# Patient Record
Sex: MALE | Race: WHITE
[De-identification: names, ages, dates, MRNs, and addresses within clinical notes are randomized per-mention and may not be internally consistent; named-entity substitution may affect disease eponyms.]

---

## 2021-12-30 ENCOUNTER — HOSPITAL ENCOUNTER (INPATIENT)
Dept: HOSPITAL 47 - EC | Age: 51
LOS: 5 days | Discharge: HOME | DRG: 872 | End: 2022-01-04
Attending: HOSPITALIST | Admitting: HOSPITALIST
Payer: COMMERCIAL

## 2021-12-30 DIAGNOSIS — Z82.49: ICD-10-CM

## 2021-12-30 DIAGNOSIS — Z20.822: ICD-10-CM

## 2021-12-30 DIAGNOSIS — I71.2: ICD-10-CM

## 2021-12-30 DIAGNOSIS — E66.01: ICD-10-CM

## 2021-12-30 DIAGNOSIS — A41.9: Primary | ICD-10-CM

## 2021-12-30 DIAGNOSIS — D63.8: ICD-10-CM

## 2021-12-30 DIAGNOSIS — K76.0: ICD-10-CM

## 2021-12-30 DIAGNOSIS — B17.9: ICD-10-CM

## 2021-12-30 DIAGNOSIS — I10: ICD-10-CM

## 2021-12-30 DIAGNOSIS — L03.116: ICD-10-CM

## 2021-12-30 LAB
ALBUMIN SERPL-MCNC: 3.1 G/DL (ref 3.5–5)
ALP SERPL-CCNC: 159 U/L (ref 38–126)
ALT SERPL-CCNC: 204 U/L (ref 4–49)
ANION GAP SERPL CALC-SCNC: 7 MMOL/L
AST SERPL-CCNC: 118 U/L (ref 17–59)
BASOPHILS # BLD AUTO: 0.1 K/UL (ref 0–0.2)
BASOPHILS NFR BLD AUTO: 1 %
BUN SERPL-SCNC: 12 MG/DL (ref 9–20)
CALCIUM SPEC-MCNC: 8.4 MG/DL (ref 8.4–10.2)
CHLORIDE SERPL-SCNC: 99 MMOL/L (ref 98–107)
CO2 SERPL-SCNC: 32 MMOL/L (ref 22–30)
EOSINOPHIL # BLD AUTO: 0.2 K/UL (ref 0–0.7)
EOSINOPHIL NFR BLD AUTO: 2 %
ERYTHROCYTE [DISTWIDTH] IN BLOOD BY AUTOMATED COUNT: 4.21 M/UL (ref 4.3–5.9)
ERYTHROCYTE [DISTWIDTH] IN BLOOD: 14.2 % (ref 11.5–15.5)
GLUCOSE SERPL-MCNC: 123 MG/DL (ref 74–99)
HCT VFR BLD AUTO: 39.4 % (ref 39–53)
HGB BLD-MCNC: 12.1 GM/DL (ref 13–17.5)
KETONES UR QL STRIP.AUTO: (no result)
LYMPHOCYTES # SPEC AUTO: 1.5 K/UL (ref 1–4.8)
LYMPHOCYTES NFR SPEC AUTO: 15 %
MCH RBC QN AUTO: 28.6 PG (ref 25–35)
MCHC RBC AUTO-ENTMCNC: 30.6 G/DL (ref 31–37)
MCV RBC AUTO: 93.6 FL (ref 80–100)
MONOCYTES # BLD AUTO: 0.4 K/UL (ref 0–1)
MONOCYTES NFR BLD AUTO: 4 %
NEUTROPHILS # BLD AUTO: 7.7 K/UL (ref 1.3–7.7)
NEUTROPHILS NFR BLD AUTO: 77 %
PH UR: 6.5 [PH] (ref 5–8)
PLATELET # BLD AUTO: 390 K/UL (ref 150–450)
POTASSIUM SERPL-SCNC: 4 MMOL/L (ref 3.5–5.1)
PROT SERPL-MCNC: 7.2 G/DL (ref 6.3–8.2)
PROT UR QL: (no result)
RBC UR QL: 7 /HPF (ref 0–5)
SODIUM SERPL-SCNC: 138 MMOL/L (ref 137–145)
SP GR UR: 1.04 (ref 1–1.03)
SQUAMOUS UR QL AUTO: 1 /HPF (ref 0–4)
UROBILINOGEN UR QL STRIP: 4 MG/DL (ref ?–2)
WBC # BLD AUTO: 10 K/UL (ref 3.8–10.6)
WBC #/AREA URNS HPF: 2 /HPF (ref 0–5)

## 2021-12-30 PROCEDURE — 80306 DRUG TEST PRSMV INSTRMNT: CPT

## 2021-12-30 PROCEDURE — 87040 BLOOD CULTURE FOR BACTERIA: CPT

## 2021-12-30 PROCEDURE — 87635 SARS-COV-2 COVID-19 AMP PRB: CPT

## 2021-12-30 PROCEDURE — 96365 THER/PROPH/DIAG IV INF INIT: CPT

## 2021-12-30 PROCEDURE — 96361 HYDRATE IV INFUSION ADD-ON: CPT

## 2021-12-30 PROCEDURE — 81001 URINALYSIS AUTO W/SCOPE: CPT

## 2021-12-30 PROCEDURE — 83605 ASSAY OF LACTIC ACID: CPT

## 2021-12-30 PROCEDURE — 80053 COMPREHEN METABOLIC PANEL: CPT

## 2021-12-30 PROCEDURE — 85379 FIBRIN DEGRADATION QUANT: CPT

## 2021-12-30 PROCEDURE — 85025 COMPLETE CBC W/AUTO DIFF WBC: CPT

## 2021-12-30 PROCEDURE — 93970 EXTREMITY STUDY: CPT

## 2021-12-30 PROCEDURE — 86140 C-REACTIVE PROTEIN: CPT

## 2021-12-30 PROCEDURE — 71275 CT ANGIOGRAPHY CHEST: CPT

## 2021-12-30 PROCEDURE — 36415 COLL VENOUS BLD VENIPUNCTURE: CPT

## 2021-12-30 PROCEDURE — 99285 EMERGENCY DEPT VISIT HI MDM: CPT

## 2021-12-30 RX ADMIN — HEPARIN SODIUM SCH: 5000 INJECTION INTRAVENOUS; SUBCUTANEOUS at 23:03

## 2021-12-30 RX ADMIN — HEPARIN SODIUM SCH UNIT: 5000 INJECTION INTRAVENOUS; SUBCUTANEOUS at 17:04

## 2021-12-30 NOTE — HP
HISTORY AND PHYSICAL



CHIEF COMPLAINTS:

Pain and swelling of the left leg and significant cellulitis.



HISTORY OF PRESENT ILLNESS:

This 51-year-old gentleman without significant past medical history, only  
history of

back pain and not being followed by any primary physician, patient is 
complaining of

pain and swelling and discharge of the left leg for the last 2 days.  The 
patient is

extremely obese.  Otherwise, there is no history of any headache, loss of

consciousness, seizures.  The patient apparently was wearing some tights socks 
and

shopping for [place]. The left leg is also slightly cold at this time.  There 
is no

history of fever, rigors, chills at this time.



PAST MEDICAL HISTORY:

History of back pain.



MEDICATIONS:

Home medications are none.



ALLERGIES:

None.



FAMILY HISTORY:

History of CHF, hypertension, hyperlipidemia, history of  in the family.



SOCIAL HISTORY:

No history of smoking. No history of alcohol.



REVIEW OF SYSTEMS:

ENT: No diminished vision.  No diminished hearing.

CARDIOVASCULAR system: No angina or palpitations.

RESPIRATIONS: No cough.

GI as mentioned earlier.

: No dysuria.

NERVOUS SYSTEM: No numbness or weakness.

ALLERGY/IMMUNOLOGY: No asthma or hayfever.

MUSCULOSKELETAL as mentioned earlier.

HEMATOLOGY/ONCOLOGY: No history of anemia.

ENDOCRINE: No history of diabetes or hypothyroidism.

CONSTITUTIONAL: As mentioned earlier.

DERMATOLOGY as mentioned earlier.

RHEUMATOLOGY: Negative.

PSYCHIATRIC: As mentioned.



PHYSICAL EXAMINATION:

Alert and oriented times three. Pulse is 98. Blood pressure 170/115, respiration
18,

temperature 99.2, pulse ox 97% on room air.

HEENT: Conjunctivae normal.

NECK: No JVD.

CARDIOVASCULAR: S1, S2 muffled.

RESPIRATION: Breath sounds diminished in the bases.  A few scattered rhonchi.

ABDOMEN: Soft, obese. Nontender.

No mass palpable.

LEGS: Significant pain and swelling and erythema, exudation. Denudation of the 
left

lower leg extending to the medial part of the left thigh also present.

NERVOUS SYSTEM: Higher functions as  mentioned. Moves all four limbs. No focal 
motor or

sensory deficits.

LYMPHATICS: No lymph nodes palpable in the neck, axillae or groin.

SKIN: No ulcer, no rash and no bleeding.

JOINTS: No active deforming arthropathy.



LABS:

Hemoglobin 12.1.  D-dimer is 1.68.  AST is 118, ALT is 204, CRP is 25, albumin 
3.2.



ASSESSMENT:

1. Extensive cellulitis of the left leg with possible early sepsis, present on

    admission.

2. Elevated .

3. Acute hepatitis possibly secondary to sepsis.

4. Elevated CRP.

5. Elevated D-dimer.

6. Mild anemia of chronic disease.

7. Super morbid obesity.

8. History of back pain.

9. FULL CODE.



RECOMMENDATIONS AND DISCUSSION:

This 51-year-old gentleman presented with multiple complex medical issues, we 
will

monitor the patient closely, continue the current medications, management and

symptomatic treatment. We will initiate broad-spectrum IV antibiotics.  Obtain

cultures.  Infectious disease evaluation.  Vascular surgery evaluation.  Overall

prognosis guarded because of multiple complex medical issues.  Further 
recommendations

to follow. I would also recommend ultrasound of the leg to rule out the 
possibility of

any DVT.  CT angio chest also will be in order because of the elevated D-dimer.

Overall prognosis guarded.  Further recommendations to follow.  Room air pulse 
ox is

96, and Covid 19 was negative also. I also recommend the patient follow up with 
primary

physician closely after discharge as well.





MMERICKAL / IJN: [id number] / Job#: [id number]

DIANA

## 2021-12-30 NOTE — CT
EXAMINATION TYPE: CT chest angio for PE

 

DATE OF EXAM: 12/30/2021

 

COMPARISON: None

 

HISTORY: 51 year-old male shortness of breath, Elevated d-dimer.

 

TECHNIQUE: Contiguous axial scanning of the chest performed with IV Contrast, patient injected with 1
00 mL of Isovue 370. Coronal/sagittal MIP reconstructions performed.

 

CT DLP: 1095.4 mGycm

Automated exposure control for dose reduction was used.

 

FINDINGS: 

The heart is mildly moderately enlarged. No pericardial effusion. Prominent epicardial fat pad. No fl
attening of the interventricular septum or reflux of contrast into the hepatic veins.

 

Aneurysmal ascending aorta 4.2 cm. Conventional arch vessel branching anatomy.

 

Large caliber to the main right and left pulmonary arteries and 2.8 and 2.5 cm, respectively, suggest
ing underlying pulmonary artery hypertension.

 

There is both suboptimal contrast bolus and breathing motion artifact and excessive Aurora's artifact
 from patient's large body habitus. This degrades assessment for pulmonary embolus. No large central 
pulmonary embolus is seen. No definite lobar branch pulmonary embolus. Most of the segmental and more
 distal arterial branches are nondiagnostic and emboli in these locations cannot be adequately exclud
ed on the basis of this exam.

 

No thoracic lymphadenopathy by CT size criteria.

 

There is some mosaic attenuation noted suggesting areas of air trapping. No kary consolidation or pl
eural effusion.

 

Low-attenuation of the hepatic parenchyma compatible with fatty infiltration. Gallbladder is mildly h
ydropic and 4.4 cm wide but without any surrounding inflammation.

 

Bones: Multilevel moderate degenerative disc disease throughout the thoracic spine. No osseous destru
ctive process.

 

 

IMPRESSION: 

 

1. THERE IS SUBOPTIMAL CONTRAST BOLUS, ARTIFACT FROM LARGE BODY HABITUS, AND BREATHING MOTION DEGRADI
NG ASSESSMENT FOR PULMONARY EMBOLUS. NO LARGE CENTRAL OR DEFINITE LOBAR BRANCH PULMONARY EMBOLUS. MOS
T OF THE SEGMENTAL AND MORE DISTAL ARTERIAL BRANCHES ARE NONDIAGNOSTIC AN EMBOLI IN THESE LOCATIONS C
ANNOT BE EXCLUDED ON THE BASIS OF THIS EXAM.

2. CARDIOMEGALY AND PULMONARY ARTERIAL HYPERTENSION.

3. MOSAIC ATTENUATION WITHIN THE LUNGS MAY BE SEEN WITH AIR TRAPPING FROM SMALL AIRWAYS DISEASE.

4. ANEURYSMAL ASCENDING AORTA AT 4.2 CM.

5. HEPATIC STEATOSIS.

## 2021-12-30 NOTE — XR
EXAMINATION TYPE: XR tibia fibula LT

 

DATE OF EXAM: 12/30/2021

 

CLINICAL HISTORY: pain

 

TECHNIQUE:  AP and lateral images of the left tibia and fibula are obtained.

 

COMPARISON: None.

 

FINDINGS:  There is no acute fracture/dislocation evident. The joint spaces  appear within normal rogers
its. Soft tissue edema may reflect underlying cellulitis. No radiopaque foreign body identified. 

 

IMPRESSION:  

 

There is no acute fracture or dislocation seen.

 

ICD 10 NO FRACTURE, INITIAL EVALUATION

## 2021-12-30 NOTE — US
EXAMINATION TYPE: US venous doppler duplex LE 

 

DATE OF EXAM: 12/30/2021 1:05 PM

 

COMPARISON: NONE

 

CLINICAL HISTORY: redness, pain, swelling. Severe redness left leg

 

SIDE PERFORMED: Bilateral  

 

TECHNIQUE:  The lower extremity deep venous system is examined utilizing real time linear array sonog
dar with graded compression, doppler sonography and color-flow sonography.

 

VESSELS IMAGED:

Common Femoral Vein

Deep Femoral Vein

Greater Saphenous Vein *

Femoral Vein

Popliteal Vein

Small Saphenous Vein *

Proximal Calf Veins

(* superficial vessels)

 

Very limited due to body habitus, Left Distal Femoral Vein and left prox pop vein not visualized. 

 

Right Leg:  Negative for DVT

 

Left Leg:  No obvious DVT seen

 

 

 

IMPRESSION: No evidence of DVT at this time.

## 2021-12-30 NOTE — P.GSCN
History of Present Illness


Consult date: 12/30/21


History of present illness: 





Lev is a 51-year-old morbidly obese male who presents to the ER with left lower

extremity pain cellulitic changes.  He states that on Christmas Eve he was about

his normal business, wearing slightly tighter socks but doing a lot of activity 

and shopping at stores.  He said he woke up the next day with some increasing 

redness of his left leg and has progressively gotten worse since then he 

presents for this.  He denies any fevers, chills, nausea, vomiting or issues 

otherwise.  He denies any issues like this in the past.  He denies any lower 

extremity pain or difficulty with ambulation prior to the onset of this





Past Medical History


Past Medical History: No Reported History


History of Any Multi-Drug Resistant Organisms: None Reported


Past Surgical History: No Surgical Hx Reported


Past Psychological History: No Psychological Hx Reported


Smoking Status: Never smoker


Past Alcohol Use History: None Reported, Daily, Occasional


Past Drug Use History: None Reported





Medications and Allergies


                                Home Medications











 Medication  Instructions  Recorded  Confirmed  Type


 


No Known Home Medications  12/30/21 12/30/21 History








                                    Allergies











Allergy/AdvReac Type Severity Reaction Status Date / Time


 


No Known Allergies Allergy   Verified 12/30/21 09:48














Surgical - Exam


                                   Vital Signs











Temp Pulse Resp BP Pulse Ox


 


 99.2 F   98   18   187/115   96 


 


 12/30/21 09:49  12/30/21 09:49  12/30/21 09:49  12/30/21 09:49  12/30/21 09:49














Gen. is a pleasant cooperative supermorbidly obese male.  HEENT is normal 

cephalic, atraumatic, extraocular motion intact.  Heart appears regular but 

distant.  Lungs are clear bilaterally although diminished.  Abdomen is soft 

obese nontender nondistended.  Extremity show no clubbing or cyanosis.  Left low

er extremity has significant erythema and cellulitic changes from essentially 

the hip down to the foot worse on the medial side.  There is some serous-

appearing drainage.  No fluctuance.  No areas of obvious abscess.  Difficult to 

palpate pulses due to patient's body habitus.  Bilateral lower extremities are 

warm and dry.  Normal capillary refill.  Motor sensory intact





Results





ultrasound is reviewed.  No obvious evidence of DVT.  Limited exam due to body 

habitus





- Labs





                                 12/30/21 09:51





                                 12/30/21 09:51


                  Abnormal Lab Results - Last 24 Hours (Table)











  12/30/21 12/30/21 Range/Units





  09:51 09:51 


 


RBC  4.21 L   (4.30-5.90)  m/uL


 


Hgb  12.1 L   (13.0-17.5)  gm/dL


 


MCHC  30.6 L   (31.0-37.0)  g/dL


 


Carbon Dioxide   32 H  (22-30)  mmol/L


 


Glucose   123 H  (74-99)  mg/dL


 


AST   118 H  (17-59)  U/L


 


ALT   204 H  (4-49)  U/L


 


Alkaline Phosphatase   159 H  ()  U/L


 


C-Reactive Protein   25.0 H  (<1.0)  mg/dL


 


Albumin   3.1 L  (3.5-5.0)  g/dL








                                 Diabetes panel











  12/30/21 Range/Units





  09:51 


 


Sodium  138  (137-145)  mmol/L


 


Potassium  4.0  (3.5-5.1)  mmol/L


 


Chloride  99  ()  mmol/L


 


Carbon Dioxide  32 H  (22-30)  mmol/L


 


BUN  12  (9-20)  mg/dL


 


Creatinine  0.92  (0.66-1.25)  mg/dL


 


Glucose  123 H  (74-99)  mg/dL


 


Calcium  8.4  (8.4-10.2)  mg/dL


 


AST  118 H  (17-59)  U/L


 


ALT  204 H  (4-49)  U/L


 


Alkaline Phosphatase  159 H  ()  U/L


 


Total Protein  7.2  (6.3-8.2)  g/dL


 


Albumin  3.1 L  (3.5-5.0)  g/dL








                                  Calcium panel











  12/30/21 Range/Units





  09:51 


 


Calcium  8.4  (8.4-10.2)  mg/dL


 


Albumin  3.1 L  (3.5-5.0)  g/dL








                                 Pituitary panel











  12/30/21 Range/Units





  09:51 


 


Sodium  138  (137-145)  mmol/L


 


Potassium  4.0  (3.5-5.1)  mmol/L


 


Chloride  99  ()  mmol/L


 


Carbon Dioxide  32 H  (22-30)  mmol/L


 


BUN  12  (9-20)  mg/dL


 


Creatinine  0.92  (0.66-1.25)  mg/dL


 


Glucose  123 H  (74-99)  mg/dL


 


Calcium  8.4  (8.4-10.2)  mg/dL








                                  Adrenal panel











  12/30/21 Range/Units





  09:51 


 


Sodium  138  (137-145)  mmol/L


 


Potassium  4.0  (3.5-5.1)  mmol/L


 


Chloride  99  ()  mmol/L


 


Carbon Dioxide  32 H  (22-30)  mmol/L


 


BUN  12  (9-20)  mg/dL


 


Creatinine  0.92  (0.66-1.25)  mg/dL


 


Glucose  123 H  (74-99)  mg/dL


 


Calcium  8.4  (8.4-10.2)  mg/dL


 


Total Bilirubin  0.5  (0.2-1.3)  mg/dL


 


AST  118 H  (17-59)  U/L


 


ALT  204 H  (4-49)  U/L


 


Alkaline Phosphatase  159 H  ()  U/L


 


Total Protein  7.2  (6.3-8.2)  g/dL


 


Albumin  3.1 L  (3.5-5.0)  g/dL














Assessment and Plan


Assessment: 





Significant left lower extremity cellulitis


Supramorbid obesity


Plan: 





At this time and does not appear to be any drainable abscess or surgical 

intervention necessitated.  We'll defer to infectious disease regarding 

antibiotics.  Would consider a topical Silvadene or gentamicin cream as well.  

Elevate lower extremities.

## 2021-12-30 NOTE — ED
General Adult HPI





- General


Source: patient, RN notes reviewed


Mode of arrival: ambulatory


Limitations: no limitations





<Aurelio Lockett - Last Filed: 12/30/21 12:22>





<Nita Coleman - Last Filed: 12/30/21 23:13>





- General


Chief complaint: Extremity Injury, Lower


Stated complaint: Cellulitis Lower left leg


Time Seen by Provider: 12/30/21 10:06





- History of Present Illness


Initial comments: 





51-year-old male presents to the emergency room for a chief complaint of 

infection of the left leg.  Patient states that prior the past 5 days he has had

some numbness of the left leg.  States it has been worsening.  Patient states it

is weeping.  He has not had fevers.  He went to urgent care today and was sent 

to the emergency room.  He denies any history of diabetes.Patient has no other 

complaints at this time including shortness of breath, chest pain, abdominal 

pain, nausea or vomiting, headache, or visual changes. (Aurelio Lockett)





- Related Data


                                Home Medications











 Medication  Instructions  Recorded  Confirmed


 


No Known Home Medications  12/30/21 12/30/21











                                    Allergies











Allergy/AdvReac Type Severity Reaction Status Date / Time


 


No Known Allergies Allergy   Verified 12/30/21 09:48














Review of Systems


ROS Other: All systems not noted in ROS Statement are negative.





<Aurelio Lockett - Last Filed: 12/30/21 12:22>


ROS Other: All systems not noted in ROS Statement are negative.





<Nita Coleman - Last Filed: 12/30/21 23:13>


ROS Statement: 


Those systems with pertinent positive or pertinent negative responses have been 

documented in the HPI.








Past Medical History


Past Medical History: No Reported History


History of Any Multi-Drug Resistant Organisms: None Reported


Past Surgical History: No Surgical Hx Reported


Past Psychological History: No Psychological Hx Reported


Smoking Status: Never smoker


Past Alcohol Use History: None Reported, Daily, Occasional


Past Drug Use History: None Reported





<Aurelio Lockett - Last Filed: 12/30/21 12:22>





General Exam


Limitations: no limitations


General appearance: alert, in no apparent distress


Head exam: Present: atraumatic


Eye exam: Present: normal appearance, PERRL, EOMI.  Absent: scleral icterus, 

conjunctival injection


ENT exam: Present: normal exam, mucous membranes moist


Neck exam: Present: normal inspection, full ROM.  Absent: tenderness


Respiratory exam: Present: normal lung sounds bilaterally.  Absent: respiratory 

distress, wheezes


Cardiovascular Exam: Present: regular rate, normal rhythm, normal heart sounds


GI/Abdominal exam: Present: soft, normal bowel sounds.  Absent: distended, 

tenderness


Extremities exam: Present: full ROM (Full range of motion of the left lower 

extremity including hip knee and ankle joint), normal capillary refill (cap 

refill less than 2 seconds of lower extremity), other (Erythema with some 

purulent drainage extending from the left ankle up to the left thigh)





<Aurelio Lockett - Last Filed: 12/30/21 12:22>





Course





                                   Vital Signs











  12/30/21 12/30/21 12/30/21





  09:49 14:00 20:00


 


Temperature 99.2 F 99.7 F H 99.0 F


 


Pulse Rate 98  


 


Pulse Rate [  110 H 89





Pulse Oximetery   





]   


 


Respiratory 18 18 17





Rate   


 


Blood Pressure 187/115  


 


Blood Pressure  182/85 160/71





[Left Arm]   


 


O2 Sat by Pulse 96 90 L 93 L





Oximetry   














Medical Decision Making





- Lab Data


Result diagrams: 


                                 12/30/21 09:51





                                 12/30/21 09:51





<Aurelio Lockett - Last Filed: 12/30/21 12:22>





- Lab Data


Result diagrams: 


                                 12/30/21 09:51





                                 12/30/21 09:51





<Nita Coleman - Last Filed: 12/30/21 23:13>





- Medical Decision Making





vitals are stable.  Patient mildly hypertensive, does not have any symptoms.  

Afebrile.  Physical exam reveals a profound cellulitis of the left lower 

extremity.  CBC does show white blood cell count of 10.  CMP is unremarkable.  

Lactic acid is normal.  COVID-19 is negative. X-ray shows no abnormalities of 

the overlying soft tissue or bone.  pt was started on Unasyn and vancomycin.  

Patient will be admitted to Dr. Sinclair's group.  He does request that Dr. Santos 

be consulted. (Aurelio Lockett)


I was available for consultation in the emergency department.  The history and 

physical exam were done by the midlevel provider. I was consulted for this 

patients care. I reviewed the case with the midlevel provider and based on 

their presentation of the patient, I agree with the assessment, medical decision

making and plan of care as documented. 


Chart was dictated using Dragon dictation software.  Attempts were made to 

correct any dictation errors however some typographical errors may persist. 


Patient was seen during a national state of emergency due to the Covid-19 

pandemic.  (Nita Coleman)





- Lab Data





                                   Lab Results











  12/30/21 12/30/21 12/30/21 Range/Units





  09:51 09:51 09:51 


 


WBC  10.0    (3.8-10.6)  k/uL


 


RBC  4.21 L    (4.30-5.90)  m/uL


 


Hgb  12.1 L    (13.0-17.5)  gm/dL


 


Hct  39.4    (39.0-53.0)  %


 


MCV  93.6    (80.0-100.0)  fL


 


MCH  28.6    (25.0-35.0)  pg


 


MCHC  30.6 L    (31.0-37.0)  g/dL


 


RDW  14.2    (11.5-15.5)  %


 


Plt Count  390    (150-450)  k/uL


 


MPV  7.3    


 


Neutrophils %  77    %


 


Lymphocytes %  15    %


 


Monocytes %  4    %


 


Eosinophils %  2    %


 


Basophils %  1    %


 


Neutrophils #  7.7    (1.3-7.7)  k/uL


 


Lymphocytes #  1.5    (1.0-4.8)  k/uL


 


Monocytes #  0.4    (0-1.0)  k/uL


 


Eosinophils #  0.2    (0-0.7)  k/uL


 


Basophils #  0.1    (0-0.2)  k/uL


 


Hypochromasia  Slight    


 


D-Dimer     (<0.60)  mg/L FEU


 


Sodium   138   (137-145)  mmol/L


 


Potassium   4.0   (3.5-5.1)  mmol/L


 


Chloride   99   ()  mmol/L


 


Carbon Dioxide   32 H   (22-30)  mmol/L


 


Anion Gap   7   mmol/L


 


BUN   12   (9-20)  mg/dL


 


Creatinine   0.92   (0.66-1.25)  mg/dL


 


Est GFR (CKD-EPI)AfAm   >90   (>60 ml/min/1.73 sqM)  


 


Est GFR (CKD-EPI)NonAf   >90   (>60 ml/min/1.73 sqM)  


 


Glucose   123 H   (74-99)  mg/dL


 


Plasma Lactic Acid Sb    1.9  (0.7-2.0)  mmol/L


 


Calcium   8.4   (8.4-10.2)  mg/dL


 


Total Bilirubin   0.5   (0.2-1.3)  mg/dL


 


AST   118 H   (17-59)  U/L


 


ALT   204 H   (4-49)  U/L


 


Alkaline Phosphatase   159 H   ()  U/L


 


C-Reactive Protein   25.0 H   (<1.0)  mg/dL


 


Total Protein   7.2   (6.3-8.2)  g/dL


 


Albumin   3.1 L   (3.5-5.0)  g/dL


 


Coronavirus (PCR)     (Not Detectd)  














  12/30/21 12/30/21 Range/Units





  10:20 12:41 


 


WBC    (3.8-10.6)  k/uL


 


RBC    (4.30-5.90)  m/uL


 


Hgb    (13.0-17.5)  gm/dL


 


Hct    (39.0-53.0)  %


 


MCV    (80.0-100.0)  fL


 


MCH    (25.0-35.0)  pg


 


MCHC    (31.0-37.0)  g/dL


 


RDW    (11.5-15.5)  %


 


Plt Count    (150-450)  k/uL


 


MPV    


 


Neutrophils %    %


 


Lymphocytes %    %


 


Monocytes %    %


 


Eosinophils %    %


 


Basophils %    %


 


Neutrophils #    (1.3-7.7)  k/uL


 


Lymphocytes #    (1.0-4.8)  k/uL


 


Monocytes #    (0-1.0)  k/uL


 


Eosinophils #    (0-0.7)  k/uL


 


Basophils #    (0-0.2)  k/uL


 


Hypochromasia    


 


D-Dimer   1.68 H  (<0.60)  mg/L FEU


 


Sodium    (137-145)  mmol/L


 


Potassium    (3.5-5.1)  mmol/L


 


Chloride    ()  mmol/L


 


Carbon Dioxide    (22-30)  mmol/L


 


Anion Gap    mmol/L


 


BUN    (9-20)  mg/dL


 


Creatinine    (0.66-1.25)  mg/dL


 


Est GFR (CKD-EPI)AfAm    (>60 ml/min/1.73 sqM)  


 


Est GFR (CKD-EPI)NonAf    (>60 ml/min/1.73 sqM)  


 


Glucose    (74-99)  mg/dL


 


Plasma Lactic Acid Sb    (0.7-2.0)  mmol/L


 


Calcium    (8.4-10.2)  mg/dL


 


Total Bilirubin    (0.2-1.3)  mg/dL


 


AST    (17-59)  U/L


 


ALT    (4-49)  U/L


 


Alkaline Phosphatase    ()  U/L


 


C-Reactive Protein    (<1.0)  mg/dL


 


Total Protein    (6.3-8.2)  g/dL


 


Albumin    (3.5-5.0)  g/dL


 


Coronavirus (PCR)  Not Detected   (Not Detectd)  














Disposition


Is patient prescribed a controlled substance at d/c from ED?: No


Time of Disposition: 12:24





<Aurelio Lockett P - Last Filed: 12/30/21 12:22>





<Nita Coleman - Last Filed: 12/30/21 23:13>


Clinical Impression: 


 Cellulitis, Transaminitis, Elevated C-reactive protein (CRP)





Disposition: ADMITTED AS IP TO THIS HOSP

## 2021-12-30 NOTE — XR
EXAMINATION TYPE: XR femur LT

 

DATE OF EXAM: 12/30/2021

 

CLINICAL HISTORY: pain

 

TECHNIQUE:  Two views of the left femur are obtained.

 

COMPARISON: None.

 

FINDINGS:  There is no acute fracture or dislocation seen of the  femur.  The hip and knee joints charissa
ear narrowed. The overlying soft tissue appears unremarkable.

 

IMPRESSION:  

There is no acute fracture or dislocation seen of the femur.

 

ICD 10 NO FRACTURE, INITIAL EVALUATION

## 2021-12-31 LAB
ALBUMIN SERPL-MCNC: 2.9 G/DL (ref 3.8–4.9)
ALBUMIN/GLOB SERPL: 0.81 G/DL (ref 1.6–3.17)
ALP SERPL-CCNC: 117 U/L (ref 41–126)
ALT SERPL-CCNC: 151 U/L (ref 10–49)
ANION GAP SERPL CALC-SCNC: 11.8 MMOL/L (ref 10–18)
AST SERPL-CCNC: 75 U/L (ref 14–35)
BASOPHILS # BLD MANUAL: 0.08 X 10*3/UL (ref 0–0.1)
BUN SERPL-SCNC: 8.5 MG/DL (ref 9–27)
BUN/CREAT SERPL: 12.14 RATIO (ref 12–20)
CALCIUM SPEC-MCNC: 7.9 MG/DL (ref 8.7–10.3)
CHLORIDE SERPL-SCNC: 98 MMOL/L (ref 96–109)
CO2 SERPL-SCNC: 26.2 MMOL/L (ref 20–27.5)
EOSINOPHIL # BLD MANUAL: 0.08 X 10*3/UL (ref 0.04–0.35)
ERYTHROCYTE [DISTWIDTH] IN BLOOD BY AUTOMATED COUNT: 3.92 X 10*6/UL (ref 4.4–5.6)
ERYTHROCYTE [DISTWIDTH] IN BLOOD: 15.2 % (ref 11.5–14.5)
GLOBULIN SER CALC-MCNC: 3.6 G/DL (ref 1.6–3.3)
GLUCOSE SERPL-MCNC: 139 MG/DL (ref 70–110)
HCT VFR BLD AUTO: 36.8 % (ref 39.6–50)
HGB BLD-MCNC: 11.1 G/DL (ref 13–17)
LYMPHOCYTES # BLD MANUAL: 0.92 X 10*3/UL (ref 0.9–5)
MCH RBC QN AUTO: 28.3 PG (ref 27–32)
MCHC RBC AUTO-ENTMCNC: 30.2 G/DL (ref 32–37)
MCV RBC AUTO: 93.9 FL (ref 80–97)
MONOCYTES # BLD MANUAL: 0.42 X 10*3/UL (ref 0.2–1)
NEUTROPHILS NFR BLD MANUAL: 78 %
NEUTS SEG # BLD MANUAL: 6.54 X 10*3/UL (ref 2–8.9)
PLATELET # BLD AUTO: 364 X 10*3/UL (ref 140–440)
POTASSIUM SERPL-SCNC: 4.6 MMOL/L (ref 3.5–5.5)
PROT SERPL-MCNC: 6.5 G/DL (ref 6.2–8.2)
SODIUM SERPL-SCNC: 136 MMOL/L (ref 135–145)
WBC # BLD AUTO: 8.38 X 10*3/UL (ref 4.5–10)

## 2021-12-31 RX ADMIN — HYDROCODONE BITARTRATE AND ACETAMINOPHEN PRN EACH: 5; 325 TABLET ORAL at 04:59

## 2021-12-31 RX ADMIN — HEPARIN SODIUM SCH UNIT: 5000 INJECTION INTRAVENOUS; SUBCUTANEOUS at 15:48

## 2021-12-31 RX ADMIN — PANTOPRAZOLE SODIUM SCH MG: 40 TABLET, DELAYED RELEASE ORAL at 08:49

## 2021-12-31 RX ADMIN — HEPARIN SODIUM SCH UNIT: 5000 INJECTION INTRAVENOUS; SUBCUTANEOUS at 08:48

## 2021-12-31 NOTE — PN
PROGRESS NOTE



DATE OF SERVICE:

12/31/2021



This 51-year-old gentleman who was admitted with extensive cellulitis of the left leg

is being closely monitored.  No chest pain.  No palpitations.  No fever.  Cultures are

negative so far.



PHYSICAL EXAMINATION:

Alert and oriented x3.  Pulse 90, blood pressure 184/80, respiration 20, temperature is

99.1, pulse ox 92% on room air.

HEENT:  Conjunctivae normal. Oral mucosa moist.

NECK:  No jugular venous distention.  No lymph node enlargement.

CARDIOVASCULAR:  S1, S2, muffled.  No S3, no S4,

RESPIRATORY: Diminished breath sounds at the bases. A few scattered rhonchi.

ABDOMEN: Soft, nontender.

LEGS:  Bilateral leg swelling and extensive left leg cellulitis, excoriation, discharge

and erythema present.



LABS:

WBC 8.1, hemoglobin 11.1,  D-dimer is 1.68.



ASSESSMENT:

1. Extensive cellulitis of the left leg with possible early sepsis, present on

    admission.

2. Elevated D-dimer.

3. Acute hepatitis possibly secondary to sepsis.

4. Elevated CRP.

5. Mild anemia of chronic disease.

6. Super morbid obesity.

7. History of back pain.

8. Hypertension.

9. Aneurysmal ascending aorta of 4.2 cm.

10.Hepatic steatosis.



RECOMMENDATIONS AND DISCUSSION:

Recommend to continue current management and symptomatic treatment.  Continue the

antibiotics.  Follow the cultures.  The venous Doppler was done which showed no

evidence of any DVT.  The chest CTA was also done which I reviewed personally, showed

suboptimal contrast, no significant pulmonary embolism, ascending aortic aneurysm at

4.2 cm. We will add Norvasc to the current regimen.





MMODL / IJN: [id number] / Job#: [id number]

## 2021-12-31 NOTE — P.CONS
History of Present Illness





- Reason for Consult


Consult date: 12/30/21


left leg cellulitis


Requesting physician: Meir Sinclair





- Chief Complaint


left leg swelling x 5 days





- History of Present Illness


History of present illness : Patient is a 51-year  male presenting to 

the ER for evaluation of left lower extremity swelling and redness in this 

patient symptom has been going on since Christmas patient noticed to have a 

diffuse swelling and redness to the left leg is also complaining of pain more of

a dull aching to sharp 5-6 over 10 no radiation patient denies high-grade fever 

though he did have significant weeping from his left leg patient went to the 

urgent care today and subsequently has been referred to the ER for further ev

aluation and treatment patient on presentation to the hospital did have low-

grade fever of 99.7 F mildly hypoxic and tachycardic patient did have a normal 

white count kidney function has been normal liver enzymes are elevated urine has

been negative urine toxin was negative corona PCR was negative patient did have 

x-rays of the left tibia and fibula no fracture or dislocation is seen patient 

did have a venous Doppler study no obvious DVT seen he also have a CT angiogram 

of the chest suboptimal contrast no evidence of any pneumonia patient was 

started on vancomycin has been admitted to hospital infectious disease was 

consulted for further management of antibiotic therapy














Review of system:


 CONSTITUTIONAL:  Positive for weakness denies high-grade fever.


EYES:  No complaint.


ENT:  No complaint.


RESPIRATORY:  No complaint.


CARDIOVASCULAR:  No complaint.


GENITOURINARY:  No complaint.


GASTROINTESTINAL:  No complaint.


MUSCULOSKELETAL: No complaint.


INTEGUMENTARY: As per history of present illness.


PSYCHOLOGIC: No complaint.


ENDOCRINE: No complaint.


NEUROLOGIC:  No complaint.








Past medical history : Reviewed, documented below


Past surgical history : Reviewed, documented below


Social history: Reviewed, documented below


Medications: Reviewed, as documented below








EXAMINATION:


Vital sigans=  Reviewed and documented below


GENERAL DESCRIPTION: Middle-aged male lying in bed, no distress. No tachypnea or

accessory muscle of respiration use.


HEENT: Shows Pallor , no scleral icterus. Oral mucous membrane is dry.


NECK: Trachea central, no thyromegaly.


LUNGS: Unlabored breathing. Clear to auscultation anteriorly. No wheeze or 

crackle.


HEART: S1, S2, regular rate and rhythm.


ABDOMEN: Soft, no tenderness , guarding or rigidity


EXTREMITIES: Extensive swelling redness to the left leg with weeping edema.


SKIN: No rash, no masses palpable.


NEUROLOGICAL: The patient is awake, alert, oriented x3, mood and affect normal.





LABS AND RADIOLOGY: Reviewed results see below





Assessment : Patient is in the hospital with extensive left lower extremity 

swelling and redness with evidence of extensive cellulitis likely from gram-

positive skin malvin in this patient who has not been on antibiotics in the 

recent past could be strep less likely MRSA or gram-negative infection











Plan: 1-discontinue vancomycin


2-cefazolin 3 g every 8 hour


3-local wound care with dry Aquacel silver dressing to the blistered area 

followed by Ace wrap from distal bilateral to below the knee this was explained 

to the patient RN


We will follow on clinical condition and cultures to further adjust medication 

if needed


Thank you for this consultation we will follow the patient along with you








Past Medical History


Past Medical History: No Reported History


Additional Past Medical History / Comment(s): Back pain


History of Any Multi-Drug Resistant Organisms: None Reported


Past Surgical History: No Surgical Hx Reported


Additional Past Surgical History / Comment(s): "tubes in my ear" as a child.


Past Anesthesia/Blood Transfusion Reactions: No Reported Reaction


Smoking Status: Never smoker





- Past Family History


  ** Father


Family Medical History: Congestive Heart Failure (CHF), Hyperlipidemia, 

Hypertension, Skin Disorder


Additional Family Medical History / Comment(s): Cellulitis





  ** Mother


Additional Family Medical History / Comment(s): Mother has a hernia.





Medications and Allergies


                                Home Medications











 Medication  Instructions  Recorded  Confirmed  Type


 


No Known Home Medications  12/30/21 12/30/21 History








                                    Allergies











Allergy/AdvReac Type Severity Reaction Status Date / Time


 


No Known Allergies Allergy   Verified 12/30/21 09:48














Physical Exam


Vitals: 


                                   Vital Signs











  Temp Pulse Pulse Resp BP BP Pulse Ox


 


 12/30/21 20:00  99.0 F   89  17   160/71  93 L


 


 12/30/21 14:00  99.7 F H   110 H  18   182/85  90 L


 


 12/30/21 09:49  99.2 F  98   18  187/115   96








                                Intake and Output











 12/30/21 12/30/21 12/30/21





 06:59 14:59 22:59


 


Output Total   400


 


Balance   -400


 


Output:   


 


  Urine   400


 


Other:   


 


  Weight  215.456 kg 215.456 kg














Results


CBC & Chem 7: 


                                 12/31/21 05:47





                                 12/31/21 05:47


Labs: 


                  Abnormal Lab Results - Last 24 Hours (Table)











  12/30/21 12/30/21 12/30/21 Range/Units





  09:51 09:51 12:41 


 


RBC  4.21 L    (4.30-5.90)  m/uL


 


Hgb  12.1 L    (13.0-17.5)  gm/dL


 


MCHC  30.6 L    (31.0-37.0)  g/dL


 


D-Dimer    1.68 H  (<0.60)  mg/L FEU


 


Carbon Dioxide   32 H   (22-30)  mmol/L


 


Glucose   123 H   (74-99)  mg/dL


 


AST   118 H   (17-59)  U/L


 


ALT   204 H   (4-49)  U/L


 


Alkaline Phosphatase   159 H   ()  U/L


 


C-Reactive Protein   25.0 H   (<1.0)  mg/dL


 


Albumin   3.1 L   (3.5-5.0)  g/dL


 


Ur Specific Gravity     (1.001-1.035)  


 


Urine Protein     (Negative)  


 


Urine Ketones     (Negative)  


 


Urine RBC     (0-5)  /hpf


 


Urine Mucus     (None)  /hpf














  12/30/21 Range/Units





  16:25 


 


RBC   (4.30-5.90)  m/uL


 


Hgb   (13.0-17.5)  gm/dL


 


MCHC   (31.0-37.0)  g/dL


 


D-Dimer   (<0.60)  mg/L FEU


 


Carbon Dioxide   (22-30)  mmol/L


 


Glucose   (74-99)  mg/dL


 


AST   (17-59)  U/L


 


ALT   (4-49)  U/L


 


Alkaline Phosphatase   ()  U/L


 


C-Reactive Protein   (<1.0)  mg/dL


 


Albumin   (3.5-5.0)  g/dL


 


Ur Specific Gravity  1.045 H  (1.001-1.035)  


 


Urine Protein  1+ H  (Negative)  


 


Urine Ketones  2+ H  (Negative)  


 


Urine RBC  7 H  (0-5)  /hpf


 


Urine Mucus  Rare H  (None)  /hpf

## 2021-12-31 NOTE — P.PN
Subjective


Progress Note Date: 12/31/21





Patient seen and examined.  No issues.  States his legs are feeling better 

overall





Objective





- Vital Signs


Vital signs: 


                                   Vital Signs











Temp  98.2 F   12/31/21 07:41


 


Pulse  90   12/31/21 07:41


 


Resp  18   12/31/21 07:41


 


BP  170/75   12/31/21 07:41


 


Pulse Ox  93 L  12/31/21 07:41








                                 Intake & Output











 12/30/21 12/31/21 12/31/21





 18:59 06:59 18:59


 


Intake Total  110 


 


Output Total 400 350 


 


Balance -400 -240 


 


Weight 215.456 kg  


 


Intake:   


 


  Oral  110 


 


Output:   


 


  Urine 400 350 


 


Other:   


 


  Voiding Method  Toilet 





  Urinal 


 


  # Voids  1 


 


  # Bowel Movements  0 














- Exam





Gen. is a pleasant cooperative morbidly obese male in no acute distress.  

Resting in bed comfortably.  HEENT is normocephalic, atraumatic, extraocular 

motion intact.  Heart appears regular.  Lungs diminished.  No respiratory 

distress.  Abdomen is obese.  Extremity show no clubbing, cyanosis.  There is mo

derate edema bilaterally.  Previously erythema up into the thigh is slightly 

improved.  Still areas of serous drainage in the lower extremities.





- Labs


CBC & Chem 7: 


                                 12/31/21 05:47





                                 12/31/21 05:47


Labs: 


                  Abnormal Lab Results - Last 24 Hours (Table)











  12/30/21 12/30/21 12/31/21 Range/Units





  12:41 16:25 05:47 


 


RBC    3.92 L  (4.40-5.60)  X 10*6/uL


 


Hgb    11.1 L  (13.0-17.0)  g/dL


 


Hct    36.8 L  (39.6-50.0)  %


 


MCHC    30.2 L  (32.0-37.0)  g/dL


 


RDW    15.2 H  (11.5-14.5)  %


 


Metamyelocytes %    3 H  (0-0)  %


 


Myelocytes %    1 H  (0-0)  %


 


D-Dimer  1.68 H    (<0.60)  mg/L FEU


 


BUN     (9.0-27.0)  mg/dL


 


Glucose     ()  mg/dL


 


Calcium     (8.7-10.3)  mg/dL


 


AST     (14-35)  U/L


 


ALT     (10-49)  U/L


 


Albumin     (3.8-4.9)  g/dL


 


Globulin     (1.6-3.3)  g/dL


 


Albumin/Globulin Ratio     (1.60-3.17)  g/dL


 


Ur Specific Gravity   1.045 H   (1.001-1.035)  


 


Urine Protein   1+ H   (Negative)  


 


Urine Ketones   2+ H   (Negative)  


 


Urine RBC   7 H   (0-5)  /hpf


 


Urine Mucus   Rare H   (None)  /hpf














  12/31/21 Range/Units





  05:47 


 


RBC   (4.40-5.60)  X 10*6/uL


 


Hgb   (13.0-17.0)  g/dL


 


Hct   (39.6-50.0)  %


 


MCHC   (32.0-37.0)  g/dL


 


RDW   (11.5-14.5)  %


 


Metamyelocytes %   (0-0)  %


 


Myelocytes %   (0-0)  %


 


D-Dimer   (<0.60)  mg/L FEU


 


BUN  8.5 L  (9.0-27.0)  mg/dL


 


Glucose  139 H  ()  mg/dL


 


Calcium  7.9 L  (8.7-10.3)  mg/dL


 


AST  75 H  (14-35)  U/L


 


ALT  151 H  (10-49)  U/L


 


Albumin  2.9 L  (3.8-4.9)  g/dL


 


Globulin  3.6 H  (1.6-3.3)  g/dL


 


Albumin/Globulin Ratio  0.81 L  (1.60-3.17)  g/dL


 


Ur Specific Gravity   (1.001-1.035)  


 


Urine Protein   (Negative)  


 


Urine Ketones   (Negative)  


 


Urine RBC   (0-5)  /hpf


 


Urine Mucus   (None)  /hpf








                      Microbiology - Last 24 Hours (Table)











 12/30/21 10:08 Blood Culture - Preliminary





 Blood    No Growth after 24 hours


 


 12/30/21 09:51 Blood Culture - Preliminary





 Blood    No Growth after 24 hours














Assessment and Plan


Assessment: 





Significant left lower extremity cellulitis


Supramorbid obesity


Plan: 





At this point the patient appears to be improving.  Continue local wound care, 

compression and elevation of the lower extremities.  No further plans for 

vascular surgical intervention and therefore we will sign off at this time.  

Please let us be of further assistance

## 2022-01-01 RX ADMIN — HYDROCODONE BITARTRATE AND ACETAMINOPHEN PRN EACH: 5; 325 TABLET ORAL at 01:08

## 2022-01-01 RX ADMIN — HEPARIN SODIUM SCH UNIT: 5000 INJECTION INTRAVENOUS; SUBCUTANEOUS at 09:05

## 2022-01-01 RX ADMIN — HEPARIN SODIUM SCH UNIT: 5000 INJECTION INTRAVENOUS; SUBCUTANEOUS at 00:19

## 2022-01-01 RX ADMIN — HEPARIN SODIUM SCH UNIT: 5000 INJECTION INTRAVENOUS; SUBCUTANEOUS at 15:47

## 2022-01-01 RX ADMIN — PANTOPRAZOLE SODIUM SCH MG: 40 TABLET, DELAYED RELEASE ORAL at 09:05

## 2022-01-01 NOTE — PN
PROGRESS NOTE



DATE OF SERVICE:

01/01/2022



REASON FOR FOLLOWUP:

Left lower extremity cellulitis.



INTERVAL HISTORY:

The patient is afebrile.  The patient is breathing comfortably.  The patient denies

having any chest pain, shortness of breath or cough.  No abdominal pain.  Overall pain

and discomfort to the left leg has slight decreased in intensity.



PHYSICAL EXAMINATION:

Blood pressure is 140/78 with a pulse of 98, temperature 98.3.  He is 97% on room air.

General description is a middle-aged male lying in bed in no distress.  Respiratory

system: Unlabored breathing, clear to auscultation anteriorly.  Heart S1, S2.  Regular

rate and rhythm.  Abdomen:  Soft, no tenderness.  Left leg swelling and redness has

slightly decreased.



LABS:

No new labs have been obtained today.  Culture has been negative so far.



DIAGNOSTIC IMPRESSION AND PLAN:

Patient with acute left lower extremity cellulitis in this patient with slow clinical

improvement.  In view of the extensive infection, patient will continue with cefazolin

3 grams q.8 hours.  Local wound care is ordered and monitor clinical course closely.





MMODL / IJN: [id number] / Job#: [id number]

## 2022-01-01 NOTE — PN
PROGRESS NOTE



DATE OF SERVICE:

01/01/2022



This 51-year-old gentleman admitted with significant cellulitis of the left leg is

being closely monitored.  The cultures are negative so far.  Patient is on empiric

antibiotics.  No chest pain.  No palpitations.  No fever.



PHYSICAL EXAMINATION:

Alert and oriented x3.  Pulse 98, blood pressure 140/78, respiration 18, temperature

98.3, pulse ox 97% on room air.

HEENT:  Conjunctivae normal. Oral mucosa moist.

NECK:  No jugular venous distention.  No lymph node enlargement.

CARDIOVASCULAR:  S1, S2, muffled.  No S3, no S4,

RESPIRATORY: Diminished breath sounds at the bases. No rhonchi, no crackles.

ABDOMEN: Soft, nontender.

LEGS:  Significant left thigh infection present.

NERVOUS SYSTEM:  No focal deficits.



LABS:

WBC 8.3, hemoglobin 11.1, sodium 136, potassium 4.6.  D-dimer is 1.68.



ASSESSMENT:

1. Extensive cellulitis of the left leg with possible early sepsis, present on

    admission.

2. Elevated D-dimer.

3. Acute hepatitis possibly secondary to sepsis.

4. Elevated CRP.

5. Mild anemia of chronic disease.

6. Super morbid obesity.

7. History of back pain.

8. Hypertension.

9. Aneurysmal ascending aorta 4.2 cm.

10.Hepatic steatosis.



RECOMMENDATIONS AND DISCUSSION:

Recommend to continue current management ,continue symptomatic treatment.  Continue

local treatment.  Continue the IV antibiotics.  Closely follow with Infectious Disease.

Guarded prognosis.  Further recommendations to follow.





MMODL / IJN: [id number] / Job#: [id number]

## 2022-01-01 NOTE — PN
PROGRESS NOTE



DATE OF SERVICE:

12/31/2021



REASON FOR FOLLOWUP:

Left lower extremity cellulitis.



INTERVAL HISTORY:

Patient is afebrile. He is breathing comfortably. Overall pain and discomfort to the

left leg is slightly decreased.  No chest pain, shortness of breath or cough.  No

abdominal pain. No diarrhea.



PHYSICAL EXAMINATION:

Blood pressure 172/84, pulse of 90, temperature 98.5. He is 92% on room air. General

description is a middle-aged male lying in bed in no distress.  Respiratory system:

Unlabored breathing, clear to auscultation anteriorly. Heart S1, S2.  Regular rate and

rhythm.  Abdomen soft. No tenderness.  Left leg swelling and  redness has slightly

decreased.



LABS:

Hemoglobin is 11.1, white count 8.38, creatinine 0.7.



DIAGNOSTIC IMPRESSION AND PLAN:

Patient with extensive left lower extremity cellulitis with diffuse swelling and

redness, likely streptococcal disease.  Patient to continue cefazolin 3 g q8 along with

local wound care with Aquacel Silver dressing and Ace wrap and continue supportive

care.





MMODL / IJN: [id number] / Job#: [id number]

## 2022-01-02 RX ADMIN — HYDROCODONE BITARTRATE AND ACETAMINOPHEN PRN EACH: 5; 325 TABLET ORAL at 00:13

## 2022-01-02 RX ADMIN — PANTOPRAZOLE SODIUM SCH MG: 40 TABLET, DELAYED RELEASE ORAL at 08:05

## 2022-01-02 RX ADMIN — HEPARIN SODIUM SCH UNIT: 5000 INJECTION INTRAVENOUS; SUBCUTANEOUS at 00:13

## 2022-01-02 RX ADMIN — HEPARIN SODIUM SCH UNIT: 5000 INJECTION INTRAVENOUS; SUBCUTANEOUS at 15:43

## 2022-01-02 RX ADMIN — HEPARIN SODIUM SCH UNIT: 5000 INJECTION INTRAVENOUS; SUBCUTANEOUS at 08:05

## 2022-01-02 RX ADMIN — HYDROCODONE BITARTRATE AND ACETAMINOPHEN PRN EACH: 5; 325 TABLET ORAL at 21:47

## 2022-01-02 NOTE — PN
PROGRESS NOTE



DATE OF SERVICE:

01/02/2022



This 51-year-old gentleman who was admitted with extensive cellulitis of the left leg

also had possible sepsis.  No chest pain.  No palpitations.  No fever.  The cultures

are negative so far.



PHYSICAL EXAMINATION:

Alert and oriented x3.  Pulse is 92, blood pressure 140/72, respiration 17, temperature

98.2, pulse ox 93% on room air.

skin is normal.

HEENT:  Conjunctivae normal. Oral mucosa moist.

NECK:  No jugular venous distention.  No lymph node enlargement.

CARDIOVASCULAR:  S1, S2, muffled.  No S3, no S4,

RESPIRATORY: Diminished breath sounds at the bases. A few scattered rhonchi.

ABDOMEN: Soft, obese.

LEGS: Significant pain, swelling and erythema and exudation of the left leg present.

NERVOUS SYSTEM: No focal deficits.



LABS:

WBC 9.8, hemoglobin 7.2.  Other labs are noted.



ASSESSMENT:

1. Extensive cellulitis of the left leg with possible early sepsis, present on

    admission.

2. Elevated D-dimer.

3. Acute hepatitis possibly secondary to sepsis.

4. Elevated CRP.

5. Mild anemia of chronic disease.

6. Super morbid obesity.

7. History of back pain.

8. Hypertension.

9. Aneurysmal ascending aorta 4.2 cm.

10.Hepatic steatosis.



RECOMMENDATIONS:

Recommend to continue current management and continue symptomatic treatment.  Repeat

labs tomorrow.  Continue the antibiotics. Otherwise, closely follow with Infectious

Disease regarding outpatient p.o. antibiotics versus intravenous antibiotics through a

PICC line.  Further recommendations to follow.  The prognosis is guarded.





MMODL / IJN: [id number] / Job#: [id number]

## 2022-01-02 NOTE — PN
PROGRESS NOTE



DATE OF SERVICE:

01/02/2022



REASON FOR FOLLOWUP:

Left lower extremity cellulitis.



INTERVAL HISTORY:

The patient is afebrile.  The patient is feeling better, breathing comfortably. The

patient denies having any chest pain or shortness of breath or cough.  Pain to the left

leg has slightly decreased and drainage has decreased.



PHYSICAL EXAMINATION:

Blood pressure 146/72 with a pulse of 92, temperature 98.8. He is 93% on room air.

General description is a middle-aged male lying in bed in no distress.  Respiratory

system: Unlabored breathing. Clear to auscultation anteriorly.  Heart S1, S2.  Regular

rate and rhythm.  Abdomen soft, no tenderness.



LABS:

Hemoglobin 11.1, white count 8.38.  No new labs have been done today.



DIAGNOSTIC IMPRESSION AND PLAN:

Patient with acute left lower extremity cellulitis in this patient who did have diffuse

swelling and redness, likely streptococcal.  Overall improvement with cefazolin.  Local

care to continue. Will repeat a CBC and _____ tomorrow, reevaluate the wound.  If

overall improvement, may be able to switch him over to oral antibiotic on discharge.

Continue with supportive care.





MMODL / IJN: [id number] / Job#: [id number]

## 2022-01-03 LAB
ALBUMIN SERPL-MCNC: 2.9 G/DL (ref 3.8–4.9)
ALBUMIN/GLOB SERPL: 0.77 G/DL (ref 1.6–3.17)
ALP SERPL-CCNC: 88 U/L (ref 41–126)
ALT SERPL-CCNC: 46 U/L (ref 10–49)
ANION GAP SERPL CALC-SCNC: 10.1 MMOL/L (ref 10–18)
AST SERPL-CCNC: 29 U/L (ref 14–35)
BASOPHILS # BLD AUTO: 0.02 X 10*3/UL (ref 0–0.1)
BASOPHILS NFR BLD AUTO: 0.2 %
BUN SERPL-SCNC: 9 MG/DL (ref 9–27)
BUN/CREAT SERPL: 10.83 RATIO (ref 12–20)
CALCIUM SPEC-MCNC: 7.9 MG/DL (ref 8.7–10.3)
CHLORIDE SERPL-SCNC: 96 MMOL/L (ref 96–109)
CO2 SERPL-SCNC: 28.3 MMOL/L (ref 20–27.5)
EOSINOPHIL # BLD AUTO: 0.14 X 10*3/UL (ref 0.04–0.35)
EOSINOPHIL NFR BLD AUTO: 1.3 %
ERYTHROCYTE [DISTWIDTH] IN BLOOD BY AUTOMATED COUNT: 3.75 X 10*6/UL (ref 4.4–5.6)
ERYTHROCYTE [DISTWIDTH] IN BLOOD: 15.1 % (ref 11.5–14.5)
GLOBULIN SER CALC-MCNC: 3.8 G/DL (ref 1.6–3.3)
GLUCOSE SERPL-MCNC: 145 MG/DL (ref 70–110)
HCT VFR BLD AUTO: 36 % (ref 39.6–50)
HGB BLD-MCNC: 10.6 G/DL (ref 13–17)
LYMPHOCYTES # SPEC AUTO: 1.61 X 10*3/UL (ref 0.9–5)
LYMPHOCYTES NFR SPEC AUTO: 14.7 %
MCH RBC QN AUTO: 28.3 PG (ref 27–32)
MCHC RBC AUTO-ENTMCNC: 29.4 G/DL (ref 32–37)
MCV RBC AUTO: 96 FL (ref 80–97)
MONOCYTES # BLD AUTO: 0.47 X 10*3/UL (ref 0.2–1)
MONOCYTES NFR BLD AUTO: 4.3 %
NEUTROPHILS # BLD AUTO: 8.46 X 10*3/UL (ref 1.8–7.7)
NEUTROPHILS NFR BLD AUTO: 77.2 %
PLATELET # BLD AUTO: 448 X 10*3/UL (ref 140–440)
POTASSIUM SERPL-SCNC: 4.7 MMOL/L (ref 3.5–5.5)
PROT SERPL-MCNC: 6.7 G/DL (ref 6.2–8.2)
SODIUM SERPL-SCNC: 134 MMOL/L (ref 135–145)
WBC # BLD AUTO: 10.95 X 10*3/UL (ref 4.5–10)

## 2022-01-03 RX ADMIN — HYDROCODONE BITARTRATE AND ACETAMINOPHEN PRN EACH: 5; 325 TABLET ORAL at 08:50

## 2022-01-03 RX ADMIN — HEPARIN SODIUM SCH UNIT: 5000 INJECTION INTRAVENOUS; SUBCUTANEOUS at 23:42

## 2022-01-03 RX ADMIN — PANTOPRAZOLE SODIUM SCH MG: 40 TABLET, DELAYED RELEASE ORAL at 08:51

## 2022-01-03 RX ADMIN — HEPARIN SODIUM SCH UNIT: 5000 INJECTION INTRAVENOUS; SUBCUTANEOUS at 08:51

## 2022-01-03 RX ADMIN — HYDROCODONE BITARTRATE AND ACETAMINOPHEN PRN EACH: 5; 325 TABLET ORAL at 23:59

## 2022-01-03 RX ADMIN — HYDROCODONE BITARTRATE AND ACETAMINOPHEN PRN EACH: 5; 325 TABLET ORAL at 17:21

## 2022-01-03 RX ADMIN — HEPARIN SODIUM SCH UNIT: 5000 INJECTION INTRAVENOUS; SUBCUTANEOUS at 17:21

## 2022-01-03 RX ADMIN — HEPARIN SODIUM SCH UNIT: 5000 INJECTION INTRAVENOUS; SUBCUTANEOUS at 00:47

## 2022-01-03 NOTE — PN
PROGRESS NOTE



DATE OF SERVICE:

01/03/2022



REASON FOR FOLLOWUP:

Left lower extremity cellulitis.



INTERVAL HISTORY:

Patient is afebrile.  The patient is breathing comfortably. The patient denies having

any chest pain.  No shortness of breath or cough.  No nausea, vomiting, abdominal pain,

or any worsening pain to the left leg area.



PHYSICAL EXAMINATION:

Blood pressure is 155/79 with pulse of 89, temperature 98.2.  He is 92% on room air.

General description is a middle-aged male lying in bed in no distress.  Respiratory

system: Unlabored breathing, clear to auscultation anteriorly.  Heart S1, S2.  Regular

rate and rhythm. Abdomen soft, no tenderness. Left leg swelling and redness has

slightly decreased. No drainage.



LABS:

Hemoglobin is 10, white count 10.95, creatinine is 0.8.



DIAGNOSTIC IMPRESSION AND PLAN:

Patient with extensive left lower extremity cellulitis with diffuse swelling and

redness and superficial ulceration, likely streptococcal disease.  Patient clinically

responding to the cefazolin to continue another 24 hours before transition to oral

antibiotics. Local care to continue with Aquacel Silver dressing and Ace wrap and

continue supportive care.





MMODL / IJN: [id number] / Job#: [id number]

## 2022-01-03 NOTE — PN
PROGRESS NOTE



DATE OF SERVICE:

01/03/2022



This is a 51-year-old gentleman who was admitted with extensive cellulitis on IV

antibiotics.  No chest pain.  No palpitations.  No fever.



PHYSICAL EXAMINATION:

Alert and oriented x3.  Pulse is 89, blood pressure 150/70, respirations 16,

temperature 98.2, pulse ox 90% on room air.

HEENT:  Conjunctivae normal. Oral mucosa moist.

NECK:  No jugular venous distention.  No lymph node enlargement.

CARDIOVASCULAR:  S1, S2, muffled.  No S3, no S4,

RESPIRATORY: Diminished breath sounds at the bases. A few scattered rhonchi.

ABDOMEN: Soft, nontender.

LEGS: No edema, no swelling.

NERVOUS SYSTEM: No focal deficits.



LABS:

WBC 10.95, hemoglobin 10.6 and sodium is 134.  Other labs are noted.



ASSESSMENT:

1. Extensive cellulitis of the left leg with possible early sepsis, present on

    admission, on IV antibiotics.

2. Negative cultures so far.

3. Elevated D-dimer.

4. Acute hepatitis possibly secondary to sepsis, present on admission, improved.

5. Elevated CRP.

6. Mild anemia of chronic disease.

7. Super morbid obesity.

8. History of back pain.

9. Hypertension.

10.Aneurysmal ascending aorta 4.2 cm.

11.Hepatic steatosis.



RECOMMENDATIONS:

Recommend to continue current management and symptomatic treatment.  Otherwise, at this

time I would recommend continue the antibiotics, continue the rest of medications and

closely follow with Dr. Santos.  Local treatment, local dressing and continue to

monitor.  Further recommendations to follow.





MMODL / IJN: [id number] / Job#: [id number]

## 2022-01-04 VITALS
DIASTOLIC BLOOD PRESSURE: 83 MMHG | RESPIRATION RATE: 18 BRPM | SYSTOLIC BLOOD PRESSURE: 150 MMHG | TEMPERATURE: 98.9 F | HEART RATE: 90 BPM

## 2022-01-04 RX ADMIN — HEPARIN SODIUM SCH UNIT: 5000 INJECTION INTRAVENOUS; SUBCUTANEOUS at 07:45

## 2022-01-04 RX ADMIN — PANTOPRAZOLE SODIUM SCH MG: 40 TABLET, DELAYED RELEASE ORAL at 07:46

## 2022-01-04 RX ADMIN — HEPARIN SODIUM SCH: 5000 INJECTION INTRAVENOUS; SUBCUTANEOUS at 17:02

## 2022-01-04 NOTE — PN
PROGRESS NOTE



DATE OF SERVICE:

01/04/2022



REASON FOR FOLLOWUP:

Left lower extremity cellulitis.



INTERVAL HISTORY:

The patient is afebrile.  The patient is feeling better.  Breathing comfortably.

Overall, left leg pain, swelling and redness slightly decreased.  No chest pain,

shortness of breath or cough.  No abdominal pain, no diarrhea.



PHYSICAL EXAMINATION:

Blood pressure 150/83 with a pulse of 90, temperature 98.9.  He is 95% on room air.

General description is a middle-aged male lying in bed in no distress.  Respiratory

system: Unlabored breathing, clear to auscultation anteriorly.  Heart S1, S2.  Regular

rate and rhythm.  Abdomen soft, no tenderness.  Left leg swelling and redness has

decreased.



LABS:

No new labs have been obtained today.  Blood culture negative.



DIAGNOSTIC IMPRESSION AND PLAN:

Patient with left lower extremity cellulitis, extensive, with overall improvement with

IV cefazolin.  Finish therapy with oral Keflex 500 mg q.6 hours for 10 days.

Prescription called to pharmacy.  Local care with dry Aquacel dressing and Ace wrap.

Advised to [facility].





MMODL / IJN: [id number] / Job#: [id number]

## 2022-01-04 NOTE — DS
DISCHARGE SUMMARY



FINAL DIAGNOSES:

1. Extensive cellulitis of the left leg with possible early sepsis, present on

    admission, on IV antibiotics and negative cultures so far.

2. Elevated D-dimer.

3. Acute hepatitis possibly secondary to sepsis present on admission improved.

4. Elevated CRP.

5. Mild anemia, chronic.

6. Super morbid obesity.

7. History of back pain.

8. Hypertension.

9. Aneurysm ascending aorta 4 point cm.





DISCHARGE DISPOSITION:

The patient will be discharged in stable condition with guarded prognosis. 
Discharge

cleared by Dr. Santos.



HISTORY OF PRESENT ILLNESS:

This 51-year-old gentleman with a past medical history of multiple medical 
problems

including significant cellulitis.  Cultures negative.  Treated empirically with

antibiotics. Patient improved significantly.  Dr. Santos cleared the patient for

discharge.

Local treatment advised.



On exam, vitals signs stable.  Cardiovascular: S1, S2. Abdomen soft.  Nervous 
system:

Left leg cellulitis present.



DISCHARGE ADVICE AND MEDICATIONS:

1. Diet is cardiac diet.

2. Activity limited until follow up.

3. Keflex 500 mg p.o. q.6 for 10 days.

4. Norvasc 10 mg daily.

5. Tylenol p.r.n.

6. Follow up with Dr. Gaming in 1 week.

7. Follow up with Dr. Santos as recommended.

Once again, the patient discharged in stable condition with guarded prognosis.





MMODL / IJN: [id number] / Job#: [id number]

MTDD